# Patient Record
Sex: MALE | Race: WHITE | NOT HISPANIC OR LATINO | Employment: OTHER | URBAN - METROPOLITAN AREA
[De-identification: names, ages, dates, MRNs, and addresses within clinical notes are randomized per-mention and may not be internally consistent; named-entity substitution may affect disease eponyms.]

---

## 2017-01-03 ENCOUNTER — HOSPITAL ENCOUNTER (OUTPATIENT)
Dept: INFUSION CENTER | Facility: HOSPITAL | Age: 82
End: 2017-01-03
Payer: MEDICARE

## 2018-01-10 NOTE — MISCELLANEOUS
Message  referral made to clinical trials for 3rd line myeloma study per Dr Lloyd Leon    1  Abnormal weight loss (783 21) (R63 4)   2  Elevated glucose (790 29) (R73 09)   3  Multiple myeloma (203 00) (C90 00)   4  Tongue lesion (529 8) (K14 8)    Current Meds   1  Aspirin 81 MG TABS; Therapy: (Recorded:23Cdg1560) to Recorded   2  Donepezil HCl TABS; Therapy: (Recorded:44Vme0596) to Recorded   3  Revlimid 15 MG Oral Capsule; one capsule 3 weeks on/1 week off   adult male   auth # [id number]; Therapy: [id number] to (Last Rx:55Rmm3041)  Requested for: 97Ieg8012 Ordered   4  Sertraline HCl TABS; Therapy: (Recorded:72Pgd2363) to Recorded   5  Simvastatin TABS; Therapy: (Recorded:14Dta2875) to Recorded    Allergies    1   No Known Drug Allergies    Signatures   Electronically signed by : Albert Singh, ; Sep 13 2016  8:16AM EST                       (Author)

## 2018-01-11 NOTE — MISCELLANEOUS
Message   Recorded as Task   Date: 09/19/2016 12:54 PM, Created By: Jay Jay Marie   Task Name: Call Back   Assigned To: America Mckeon   Regarding Patient: Filiberto Zambrano, Status: Active   Comment:    Belinda Villafuerte - 19 Sep 2016 12:54 PM     TASK CREATED  Caller: Jair Kumari, Spouse; Other; [phone] (Home)  CALLING RE NEW MEDICINE POLYMST, HAS A FEW QUESTIONS  TXS   America Mckeon - 19 Sep 2016 1:00 PM     TASK EDITED  spoke with Ncik Lewis  she will not accept delivery of pomalyst until cleared by our office or clinical trial dept  [address] to place pomalyst order on hold for now        Active Problems    1  Abnormal weight loss (783 21) (R63 4)   2  Elevated glucose (790 29) (R73 09)   3  Multiple myeloma (203 00) (C90 00)    Current Meds   1  Aspirin 81 MG TABS; Therapy: (Recorded:87Wds6641) to Recorded   2  Donepezil HCl TABS; Therapy: (Recorded:74Qaf3261) to Recorded   3  Pomalyst 4 MG Oral Capsule; one tab daily, 3 weeks on  1 week off---NEW script(order   change)   adult male   [place]; Therapy: 10Ikw7155 to (Last Rx:32Pdz2977)  Requested for: 70Vnl5770 Ordered   4  Sertraline HCl TABS; Therapy: (Recorded:56Iez3475) to Recorded   5  Simvastatin TABS; Therapy: (Recorded:49Zmu1480) to Recorded    Allergies    1   No Known Drug Allergies    Signatures   Electronically signed by : Kristin Reed, ; Sep 19 2016  1:00PM EST                       (Author)

## 2018-01-15 NOTE — MISCELLANEOUS
Message  labwork reviewed with Dr Justyn soto to resume pomalyst 3mg every other day  wife verbalizes understanding      Active Problems    1  Abnormal weight loss (783 21) (R63 4)   2  ARF (acute renal failure) (584 9) (N17 9)   3  Chemotherapy-induced neutropenia (288 03,E933 1) (D70 1)   4  Elevated glucose (790 29) (R73 09)   5  Multiple myeloma (203 00) (C90 00)    Current Meds   1  Aspirin 81 MG TABS; Therapy: (Recorded:31Chm4963) to Recorded   2  Dexamethasone 4 MG Oral Tablet; 5 tabs po weekly with food; Therapy: 03PYX4897 to (Last Rx:07Oct2016)  Requested for: 38Ycb7015 Ordered   3  Donepezil HCl TABS; Therapy: (Recorded:77Imx0700) to Recorded   4  Pomalyst 3 MG Oral Capsule; 3 WEEKS ON , ONE WEEK OFF-DOSAGE CHANGE   adult male   auth # [id number]; Therapy: 87RNT6313 to (Last [facility])  Requested for: 24Oct2016 Ordered   5  Sertraline HCl TABS; Therapy: (Recorded:76Vsk6644) to Recorded   6  Simvastatin TABS; Therapy: (Recorded:43Tri8865) to Recorded    Allergies    1   No Known Drug Allergies    Signatures   Electronically signed by : Bob Taylor, ; Nov 9 2016  1:43PM EST                       (Author)

## 2018-01-16 NOTE — MISCELLANEOUS
Message  d/w Dr Miriam Ramires  he would like patient evaluated in ED  spoke with Gillian Garcia      Active Problems    1  Abnormal weight loss (783 21) (R63 4)   2  ARF (acute renal failure) (584 9) (N17 9)   3  Chemotherapy-induced neutropenia (288 03,E933 1) (D70 1)   4  Elevated glucose (790 29) (R73 09)   5  Multiple myeloma (203 00) (C90 00)    Current Meds   1  Aspirin 81 MG TABS; Therapy: (Recorded:40Efm3240) to Recorded   2  Dexamethasone 4 MG Oral Tablet; 5 tabs po weekly with food; Therapy: 34ONA1147 to (Last Rx:07Oct2016)  Requested for: 07Oct2016 Ordered   3  Donepezil HCl TABS; Therapy: (Recorded:11Ykg3707) to Recorded   4  Pomalyst 2 MG Oral Capsule; one tab daily 3 weeks on/ 1 week off-dose change   Adult male Auth # [id number]; Therapy: [id number] to (Last Rx:29Nov2016)  Requested for: 59VWJ6328 Ordered   5  Sertraline HCl TABS; Therapy: (Recorded:10Zyu4764) to Recorded   6  Simvastatin TABS; Therapy: (Recorded:23Bme3086) to Recorded    Allergies    1   No Known Drug Allergies    Signatures   Electronically signed by : Wanda Jackson, ; Nov 30 2016  1:15PM EST                       (Author)

## 2024-05-05 NOTE — MISCELLANEOUS
Message  spoke with wife  renu and family requesting hospice  stat referral made as patient is having issues with pain      Active Problems    1  Abnormal weight loss (783 21) (R63 4)   2  ARF (acute renal failure) (584 9) (N17 9)   3  Chemotherapy-induced neutropenia (288 03,E933 1) (D70 1)   4  Elevated glucose (790 29) (R73 09)   5  Multiple myeloma (203 00) (C90 00)   6  Pain (780 96) (R52)    Current Meds   1  Aspirin 81 MG TABS; Therapy: (Recorded:58Hkx9821) to Recorded   2  Dexamethasone 4 MG Oral Tablet; 5 tabs po weekly with food; Therapy: 15DGX3871 to (Last Rx:07Oct2016)  Requested for: 07Oct2016 Ordered   3  Donepezil HCl TABS; Therapy: (Recorded:71Fwq7102) to Recorded   4  OxyCODONE HCl - 5 MG Oral Tablet; Take 0 5-1 tab q 4 h PRN; Therapy: 78PGY4352 to Recorded   5  Pomalyst 2 MG Oral Capsule; one tab daily 3 weeks on/ 1 week off-dose change   Adult male Auth # [id number]; Therapy: 41TOF7228 to (Last Rx:29Nov2016)  Requested for: 54ZRK8372 Ordered   6  Sertraline HCl TABS; Therapy: (Recorded:10Pkj1850) to Recorded   7  Simvastatin TABS; Therapy: (Recorded:33Hgf7261) to Recorded    Allergies    1  No Known Drug Allergies    Plan  Multiple myeloma    · *1 - [facility] Physician Referral  Consult Only: the expectation is that the referring  provider will communicate back to the patient on treatment options  Evaluation and  Treatment: the expectation is that the referred to provider will communicate back to the  patient on treatment options      CONSULT HOME HOSPICE ASAP  Status: Hold For - Scheduling,Retrospective By  Protocol Authorization  Requested for: [id number]  Care Summary provided  : Yes    Signatures   Electronically signed by : Laura Beltre, ; Dec  6 2016 10:46AM EST                       (Author)
No indicators present